# Patient Record
Sex: MALE | Race: WHITE | Employment: UNEMPLOYED | URBAN - METROPOLITAN AREA
[De-identification: names, ages, dates, MRNs, and addresses within clinical notes are randomized per-mention and may not be internally consistent; named-entity substitution may affect disease eponyms.]

---

## 2017-03-03 DIAGNOSIS — L28.0 LICHEN SIMPLEX CHRONICUS: Primary | ICD-10-CM

## 2017-03-06 RX ORDER — CLOBETASOL PROPIONATE 0.5 MG/G
EMULSION TOPICAL
Qty: 60 G | Refills: 0 | Status: SHIPPED | OUTPATIENT
Start: 2017-03-06

## 2017-03-07 NOTE — TELEPHONE ENCOUNTER
Pt is requesting a refill of clobetasol for LSC. Pt most recently saw Dr. Laguerre in May 2016. It is reasonable to refill without any additional refills. Pt should F/U with Dr. Laguerre this spring, and any future refills should be rejected until the pt sees a provider in the department.

## 2018-01-25 ENCOUNTER — TELEPHONE (OUTPATIENT)
Dept: DERMATOLOGY | Facility: CLINIC | Age: 63
End: 2018-01-25

## 2018-01-25 NOTE — TELEPHONE ENCOUNTER
Pt called wanting a refill of CLOBETASOL. Informed him of last refill note indicated that he could no longer received refill without follow up.     He is unpleased that he did not know that he was to follow up after his last refill 03/2017.    Pt last seen in 05/2016  PT communicated the would see not see onyone at this clinic.   Pt was angry, used profanity with caller stating that I was against him. Hung up phone.       Marjan Sun RN